# Patient Record
Sex: MALE | Race: BLACK OR AFRICAN AMERICAN | NOT HISPANIC OR LATINO | URBAN - NONMETROPOLITAN AREA
[De-identification: names, ages, dates, MRNs, and addresses within clinical notes are randomized per-mention and may not be internally consistent; named-entity substitution may affect disease eponyms.]

---

## 2024-06-19 ENCOUNTER — OFFICE (OUTPATIENT)
Dept: URBAN - NONMETROPOLITAN AREA CLINIC 5 | Facility: CLINIC | Age: 34
End: 2024-06-19
Payer: COMMERCIAL

## 2024-06-19 VITALS
HEART RATE: 88 BPM | HEIGHT: 72 IN | DIASTOLIC BLOOD PRESSURE: 58 MMHG | SYSTOLIC BLOOD PRESSURE: 130 MMHG | WEIGHT: 221 LBS | RESPIRATION RATE: 19 BRPM

## 2024-06-19 DIAGNOSIS — R74.8 ABNORMAL LEVELS OF OTHER SERUM ENZYMES: ICD-10-CM

## 2024-06-19 DIAGNOSIS — I50.20 UNSPECIFIED SYSTOLIC (CONGESTIVE) HEART FAILURE: ICD-10-CM

## 2024-06-19 DIAGNOSIS — N18.9 CHRONIC KIDNEY DISEASE, UNSPECIFIED: ICD-10-CM

## 2024-06-19 PROCEDURE — 99204 OFFICE O/P NEW MOD 45 MIN: CPT | Performed by: INTERNAL MEDICINE

## 2024-06-19 NOTE — SERVICENOTES
Given patient's elevated ALT will plan for serologic workup to rule out other causes of elevated liver enzymes.  Will proceed with ultrasound of his liver.  He denies previously having elevated liver enzymes.  He does have systolic heart failure and CKD.  He is overweight and counseled on possible metabolic associated steatotic liver disease.  If workup is unremarkable would plan for weight loss and a recheck in 6 months.  If with worsening enzymes would plan for liver biopsy at that time.

## 2024-06-19 NOTE — SERVICEHPINOTES
Jonathan Valle   is a   33 yo  male  with a past medical history of ADD, HTN, CKD stage 3, and chronic systolic CHF who presents to establish care for elevated liver enzymes. Blood work on 04/24/2024 revealed sodium 146, potassium 5.2, chloride 109, CO2 28, BUN 21, creatinine 1.6, T bili 0.5, AST 49, , alk-phos 49, A1c 4.9%, normal TSH, negative hepatitis-C antibody, WBC 7.1, hemoglobin 14.2, platelets 293. Given patient's elevated liver enzymes he was referred to establish care. Patient was subsequently admitted to the hospital on 05/11 for shortness of breath, lower extremity edema, dyspnea on exertion, and orthopnea. His last EF was noted to be 35%. Patient was initiated on Lasix IV twice daily and had good response. He was diuresed 35 lbs and discharged home. Patient presents now to establish care for his elevated liver enzymes.   Patient denies any personal or family history of liver disease.  He denies smoking, EtOH use, illicit drug use, prior IV drug use, blood transfusions, or tattoos.  He denies any confusion, jaundice, hematemesis, melena, bright red per rectum, or weight loss.  He does report some lower extremity edema which he attributes to his heart failure.  He lives alone but lives next door to his grandmother.  He has not had any recent imaging of his liver.  He does not believe he has had a serologic workup to rule out other causes of elevated liver enzymes.